# Patient Record
Sex: MALE | Race: WHITE | NOT HISPANIC OR LATINO | Employment: FULL TIME | URBAN - NONMETROPOLITAN AREA
[De-identification: names, ages, dates, MRNs, and addresses within clinical notes are randomized per-mention and may not be internally consistent; named-entity substitution may affect disease eponyms.]

---

## 2017-03-24 ENCOUNTER — HOSPITAL ENCOUNTER (EMERGENCY)
Facility: HOSPITAL | Age: 37
Discharge: HOME | End: 2017-03-24
Attending: EMERGENCY MEDICINE | Admitting: EMERGENCY MEDICINE

## 2017-03-24 ENCOUNTER — APPOINTMENT (OUTPATIENT)
Dept: CT IMAGING | Facility: HOSPITAL | Age: 37
End: 2017-03-24

## 2017-03-24 VITALS
OXYGEN SATURATION: 100 % | WEIGHT: 145 LBS | RESPIRATION RATE: 18 BRPM | DIASTOLIC BLOOD PRESSURE: 84 MMHG | HEIGHT: 66 IN | SYSTOLIC BLOOD PRESSURE: 122 MMHG | TEMPERATURE: 98.3 F | HEART RATE: 90 BPM | BODY MASS INDEX: 23.3 KG/M2

## 2017-03-24 DIAGNOSIS — N39.0 ACUTE UTI: ICD-10-CM

## 2017-03-24 DIAGNOSIS — N20.1 LEFT URETERAL STONE: Primary | ICD-10-CM

## 2017-03-24 LAB
ALBUMIN SERPL-MCNC: 4.7 G/DL (ref 3.5–5)
ALBUMIN/GLOB SERPL: 1.6 G/DL (ref 1.1–2.5)
ALP SERPL-CCNC: 76 U/L (ref 24–120)
ALT SERPL W P-5'-P-CCNC: 26 U/L (ref 0–54)
AMYLASE SERPL-CCNC: 91 U/L (ref 30–110)
ANION GAP SERPL CALCULATED.3IONS-SCNC: 12 MMOL/L (ref 4–13)
AST SERPL-CCNC: 24 U/L (ref 7–45)
BACTERIA UR QL AUTO: ABNORMAL /HPF
BASOPHILS # BLD AUTO: 0.06 10*3/MM3 (ref 0–0.2)
BASOPHILS NFR BLD AUTO: 0.9 % (ref 0–2)
BILIRUB SERPL-MCNC: 0.6 MG/DL (ref 0.1–1)
BILIRUB UR QL STRIP: NEGATIVE
BUN BLD-MCNC: 10 MG/DL (ref 5–21)
BUN/CREAT SERPL: 8.5 (ref 7–25)
CALCIUM SPEC-SCNC: 9.9 MG/DL (ref 8.4–10.4)
CHLORIDE SERPL-SCNC: 104 MMOL/L (ref 98–110)
CLARITY UR: ABNORMAL
CO2 SERPL-SCNC: 27 MMOL/L (ref 24–31)
COLOR UR: ABNORMAL
CREAT BLD-MCNC: 1.17 MG/DL (ref 0.5–1.4)
DEPRECATED RDW RBC AUTO: 39.4 FL (ref 40–54)
EOSINOPHIL # BLD AUTO: 0.04 10*3/MM3 (ref 0–0.7)
EOSINOPHIL NFR BLD AUTO: 0.6 % (ref 0–4)
ERYTHROCYTE [DISTWIDTH] IN BLOOD BY AUTOMATED COUNT: 13.1 % (ref 12–15)
GFR SERPL CREATININE-BSD FRML MDRD: 70 ML/MIN/1.73
GLOBULIN UR ELPH-MCNC: 3 GM/DL
GLUCOSE BLD-MCNC: 100 MG/DL (ref 70–100)
GLUCOSE UR STRIP-MCNC: NEGATIVE MG/DL
HCT VFR BLD AUTO: 47.4 % (ref 40–52)
HGB BLD-MCNC: 17 G/DL (ref 14–18)
HGB UR QL STRIP.AUTO: ABNORMAL
IMM GRANULOCYTES # BLD: 0.01 10*3/MM3 (ref 0–0.03)
IMM GRANULOCYTES NFR BLD: 0.1 % (ref 0–5)
KETONES UR QL STRIP: NEGATIVE
LEUKOCYTE ESTERASE UR QL STRIP.AUTO: ABNORMAL
LIPASE SERPL-CCNC: 103 U/L (ref 23–203)
LYMPHOCYTES # BLD AUTO: 2.48 10*3/MM3 (ref 0.72–4.86)
LYMPHOCYTES NFR BLD AUTO: 36.5 % (ref 15–45)
MCH RBC QN AUTO: 29.9 PG (ref 28–32)
MCHC RBC AUTO-ENTMCNC: 35.9 G/DL (ref 33–36)
MCV RBC AUTO: 83.3 FL (ref 82–95)
MONOCYTES # BLD AUTO: 0.8 10*3/MM3 (ref 0.19–1.3)
MONOCYTES NFR BLD AUTO: 11.8 % (ref 4–12)
NEUTROPHILS # BLD AUTO: 3.4 10*3/MM3 (ref 1.87–8.4)
NEUTROPHILS NFR BLD AUTO: 50.1 % (ref 39–78)
NITRITE UR QL STRIP: NEGATIVE
NT-PROBNP SERPL-MCNC: 22.4 PG/ML (ref 0–450)
PH UR STRIP.AUTO: >=9 [PH] (ref 5–8)
PLATELET # BLD AUTO: 231 10*3/MM3 (ref 130–400)
PMV BLD AUTO: 10.7 FL (ref 6–12)
POTASSIUM BLD-SCNC: 4 MMOL/L (ref 3.5–5.3)
PROT SERPL-MCNC: 7.7 G/DL (ref 6.3–8.7)
PROT UR QL STRIP: ABNORMAL
RBC # BLD AUTO: 5.69 10*6/MM3 (ref 4.8–5.9)
RBC # UR: ABNORMAL /HPF
REF LAB TEST METHOD: ABNORMAL
SODIUM BLD-SCNC: 143 MMOL/L (ref 135–145)
SP GR UR STRIP: 1.03 (ref 1–1.03)
SQUAMOUS #/AREA URNS HPF: ABNORMAL /HPF
UROBILINOGEN UR QL STRIP: ABNORMAL
WBC NRBC COR # BLD: 6.79 10*3/MM3 (ref 4.8–10.8)
WBC UR QL AUTO: ABNORMAL /HPF

## 2017-03-24 PROCEDURE — 74177 CT ABD & PELVIS W/CONTRAST: CPT

## 2017-03-24 PROCEDURE — 25010000002 HYDROMORPHONE PER 4 MG: Performed by: EMERGENCY MEDICINE

## 2017-03-24 PROCEDURE — 81001 URINALYSIS AUTO W/SCOPE: CPT | Performed by: EMERGENCY MEDICINE

## 2017-03-24 PROCEDURE — 25010000002 ONDANSETRON PER 1 MG: Performed by: EMERGENCY MEDICINE

## 2017-03-24 PROCEDURE — 96365 THER/PROPH/DIAG IV INF INIT: CPT

## 2017-03-24 PROCEDURE — 83880 ASSAY OF NATRIURETIC PEPTIDE: CPT | Performed by: EMERGENCY MEDICINE

## 2017-03-24 PROCEDURE — 80053 COMPREHEN METABOLIC PANEL: CPT | Performed by: EMERGENCY MEDICINE

## 2017-03-24 PROCEDURE — 96375 TX/PRO/DX INJ NEW DRUG ADDON: CPT

## 2017-03-24 PROCEDURE — 0 IOPAMIDOL PER 1 ML: Performed by: EMERGENCY MEDICINE

## 2017-03-24 PROCEDURE — 96376 TX/PRO/DX INJ SAME DRUG ADON: CPT

## 2017-03-24 PROCEDURE — 99283 EMERGENCY DEPT VISIT LOW MDM: CPT

## 2017-03-24 PROCEDURE — 96361 HYDRATE IV INFUSION ADD-ON: CPT

## 2017-03-24 PROCEDURE — 83690 ASSAY OF LIPASE: CPT | Performed by: EMERGENCY MEDICINE

## 2017-03-24 PROCEDURE — 87086 URINE CULTURE/COLONY COUNT: CPT | Performed by: EMERGENCY MEDICINE

## 2017-03-24 PROCEDURE — 85025 COMPLETE CBC W/AUTO DIFF WBC: CPT | Performed by: EMERGENCY MEDICINE

## 2017-03-24 PROCEDURE — 82150 ASSAY OF AMYLASE: CPT | Performed by: EMERGENCY MEDICINE

## 2017-03-24 PROCEDURE — 25010000002 CEFTRIAXONE: Performed by: EMERGENCY MEDICINE

## 2017-03-24 PROCEDURE — 25010000002 KETOROLAC TROMETHAMINE PER 15 MG: Performed by: EMERGENCY MEDICINE

## 2017-03-24 RX ORDER — ONDANSETRON 4 MG/1
4 TABLET, ORALLY DISINTEGRATING ORAL EVERY 8 HOURS PRN
Qty: 6 TABLET | Refills: 0 | Status: SHIPPED | OUTPATIENT
Start: 2017-03-24

## 2017-03-24 RX ORDER — KETOROLAC TROMETHAMINE 10 MG/1
10 TABLET, FILM COATED ORAL EVERY 6 HOURS PRN
Qty: 8 TABLET | Refills: 0 | Status: SHIPPED | OUTPATIENT
Start: 2017-03-24

## 2017-03-24 RX ORDER — KETOROLAC TROMETHAMINE 30 MG/ML
30 INJECTION, SOLUTION INTRAMUSCULAR; INTRAVENOUS ONCE
Status: COMPLETED | OUTPATIENT
Start: 2017-03-24 | End: 2017-03-24

## 2017-03-24 RX ORDER — TAMSULOSIN HYDROCHLORIDE 0.4 MG/1
1 CAPSULE ORAL DAILY
Qty: 30 CAPSULE | Refills: 0 | Status: SHIPPED | OUTPATIENT
Start: 2017-03-24

## 2017-03-24 RX ORDER — CIPROFLOXACIN 500 MG/1
500 TABLET, FILM COATED ORAL EVERY 12 HOURS
Qty: 6 TABLET | Refills: 0 | Status: SHIPPED | OUTPATIENT
Start: 2017-03-24 | End: 2017-03-27

## 2017-03-24 RX ORDER — ONDANSETRON 2 MG/ML
4 INJECTION INTRAMUSCULAR; INTRAVENOUS ONCE
Status: COMPLETED | OUTPATIENT
Start: 2017-03-24 | End: 2017-03-24

## 2017-03-24 RX ADMIN — ONDANSETRON 4 MG: 2 INJECTION INTRAMUSCULAR; INTRAVENOUS at 08:40

## 2017-03-24 RX ADMIN — HYDROMORPHONE HYDROCHLORIDE 1 MG: 1 INJECTION, SOLUTION INTRAMUSCULAR; INTRAVENOUS; SUBCUTANEOUS at 09:27

## 2017-03-24 RX ADMIN — ONDANSETRON 4 MG: 2 INJECTION INTRAMUSCULAR; INTRAVENOUS at 09:27

## 2017-03-24 RX ADMIN — KETOROLAC TROMETHAMINE 30 MG: 30 INJECTION, SOLUTION INTRAMUSCULAR at 10:52

## 2017-03-24 RX ADMIN — SODIUM CHLORIDE 1000 ML: 9 INJECTION, SOLUTION INTRAVENOUS at 08:40

## 2017-03-24 RX ADMIN — IOPAMIDOL 100 ML: 755 INJECTION, SOLUTION INTRAVENOUS at 09:39

## 2017-03-24 RX ADMIN — CEFTRIAXONE 1 G: 1 INJECTION, POWDER, FOR SOLUTION INTRAMUSCULAR; INTRAVENOUS at 10:53

## 2017-03-24 NOTE — ED PROVIDER NOTES
"Subjective   HPI Comments: Patient is a 36-year-old male who reports he was driving this morning and at about 6:45 AM he had sudden onset of \"constant, sharp\" left lower quadrant abdominal pain.  Patient reports that since then he has had nausea and vomiting times one.  He reports he is also had difficulty passing urine but when he denies he has burning-type urination and now urinary frequency/urgency.  Patient reports abdominal pain is worsened by nothing and relieved by nothing.  He reports his abdominal pain currently is an 8 and at its worse is a 10.      History provided by:  Patient      Review of Systems   Constitutional: Negative.    HENT: Negative.    Eyes: Negative.    Respiratory: Negative.    Cardiovascular: Negative.    Gastrointestinal: Positive for abdominal pain, nausea and vomiting.   Endocrine: Negative.    Genitourinary: Positive for difficulty urinating, dysuria, frequency and urgency.   Musculoskeletal: Negative.    Skin: Negative.    Allergic/Immunologic: Negative.    Neurological: Negative.    Hematological: Negative.    Psychiatric/Behavioral: Negative.    All other systems reviewed and are negative.      History reviewed. No pertinent past medical history.    No Known Allergies    Past Surgical History:   Procedure Laterality Date   • DENTAL PROCEDURE         History reviewed. No pertinent family history.    Social History     Social History   • Marital status:      Spouse name: N/A   • Number of children: N/A   • Years of education: N/A     Social History Main Topics   • Smoking status: Current Every Day Smoker     Packs/day: 2.00   • Smokeless tobacco: None   • Alcohol use No   • Drug use: No   • Sexual activity: Not Asked     Other Topics Concern   • None     Social History Narrative   • None           Objective   Physical Exam   Constitutional: He is oriented to person, place, and time. He appears well-developed and well-nourished.   Mildly ill-appearing.   HENT:   Head: " Normocephalic and atraumatic.   Right Ear: External ear normal.   Left Ear: External ear normal.   Nose: Nose normal.   Mouth/Throat: Oropharynx is clear and moist.   Eyes: Conjunctivae and EOM are normal. Pupils are equal, round, and reactive to light. Right eye exhibits no discharge. Left eye exhibits no discharge.   Neck: Normal range of motion. Neck supple. No tracheal deviation present. No thyromegaly present.   Cardiovascular: Normal rate, regular rhythm, normal heart sounds and intact distal pulses.    No murmur heard.  Pulmonary/Chest: Effort normal and breath sounds normal. No respiratory distress. He exhibits no tenderness.   Abdominal: Soft. He exhibits no distension. There is tenderness (Moderate pain to palpation of the left flank/left lower abdominal pain area.).   Musculoskeletal: Normal range of motion. He exhibits no edema, tenderness or deformity.   Neurological: He is alert and oriented to person, place, and time. No cranial nerve deficit.   Skin: Skin is warm and dry. No erythema. No pallor.   Psychiatric: He has a normal mood and affect. Judgment normal.   Nursing note and vitals reviewed.      Procedures         ED Course  ED Course                  MDM  Number of Diagnoses or Management Options  Acute UTI: new and requires workup  Left ureteral stone: new and requires workup     Amount and/or Complexity of Data Reviewed  Clinical lab tests: ordered and reviewed  Tests in the radiology section of CPT®: ordered and reviewed    Risk of Complications, Morbidity, and/or Mortality  Presenting problems: moderate  Diagnostic procedures: moderate  Management options: moderate    Patient Progress  Patient progress: stable      Final diagnoses:   Left ureteral stone   Acute UTI            Joaquín Hwang MD  03/24/17 5374

## 2017-03-24 NOTE — ED NOTES
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      Gee Cody RN  03/24/17 0802

## 2017-03-26 LAB — BACTERIA SPEC AEROBE CULT: NORMAL

## 2017-04-06 ENCOUNTER — TELEPHONE (OUTPATIENT)
Dept: UROLOGY | Facility: CLINIC | Age: 37
End: 2017-04-06

## 2017-04-06 DIAGNOSIS — N20.0 KIDNEY STONE: Primary | ICD-10-CM

## 2017-04-10 ENCOUNTER — HOSPITAL ENCOUNTER (OUTPATIENT)
Dept: GENERAL RADIOLOGY | Facility: HOSPITAL | Age: 37
Discharge: HOME | End: 2017-04-10
Attending: UROLOGY | Admitting: UROLOGY

## 2017-04-10 ENCOUNTER — OFFICE VISIT (OUTPATIENT)
Dept: UROLOGY | Facility: CLINIC | Age: 37
End: 2017-04-10

## 2017-04-10 VITALS
DIASTOLIC BLOOD PRESSURE: 78 MMHG | HEIGHT: 67 IN | BODY MASS INDEX: 24.23 KG/M2 | SYSTOLIC BLOOD PRESSURE: 130 MMHG | WEIGHT: 154.4 LBS | TEMPERATURE: 97.4 F

## 2017-04-10 DIAGNOSIS — N20.0 KIDNEY STONE: ICD-10-CM

## 2017-04-10 DIAGNOSIS — N13.2 URETERAL STONE WITH HYDRONEPHROSIS: Primary | ICD-10-CM

## 2017-04-10 LAB
BILIRUB BLD-MCNC: NEGATIVE MG/DL
CLARITY, POC: CLEAR
COLOR UR: YELLOW
GLUCOSE UR STRIP-MCNC: ABNORMAL MG/DL
KETONES UR QL: NEGATIVE
LEUKOCYTE EST, POC: NEGATIVE
NITRITE UR-MCNC: NEGATIVE MG/ML
PH UR: 5 [PH] (ref 5–8)
PROT UR STRIP-MCNC: NEGATIVE MG/DL
RBC # UR STRIP: NEGATIVE /UL
SP GR UR: 1.03 (ref 1–1.03)
UROBILINOGEN UR QL: NORMAL

## 2017-04-10 PROCEDURE — 99203 OFFICE O/P NEW LOW 30 MIN: CPT | Performed by: UROLOGY

## 2017-04-10 PROCEDURE — 81003 URINALYSIS AUTO W/O SCOPE: CPT | Performed by: UROLOGY

## 2017-04-10 PROCEDURE — 74000 HC ABDOMEN KUB: CPT

## 2017-04-10 RX ORDER — LOSARTAN POTASSIUM 25 MG/1
TABLET ORAL
COMMUNITY
Start: 2017-04-06

## 2017-04-10 NOTE — PROGRESS NOTES
Subjective    Mr. Dee is 36 y.o. male    Chief Complaint: Left Flank Pain    History of Present Illness     Urolithiasis  Patient complains of left flank pain with radiation to the abdomen. Onset of symptoms was abrupt starting 2 weeks ago with rapidly improving course since that time. Patient describes the pain as colicky, continuous and rated as moderate. The patient has had no nausea and no vomiting. There has been no fever or chills. The patient is not complaining of dysuria, frequency, or urgency.  Previous management of stones includes spontaneous passage      The following portions of the patient's history were reviewed and updated as appropriate: allergies, current medications, past family history, past medical history, past social history, past surgical history and problem list.    Review of Systems   Constitutional: Negative for appetite change and fever.   HENT: Negative for hearing loss and sore throat.    Eyes: Negative for pain and redness.   Respiratory: Negative for cough and shortness of breath.    Cardiovascular: Negative for chest pain and leg swelling.   Gastrointestinal: Negative for anal bleeding, nausea and vomiting.   Endocrine: Negative for cold intolerance and heat intolerance.   Genitourinary: Positive for flank pain (left). Negative for dysuria and hematuria.   Musculoskeletal: Negative for joint swelling and myalgias.   Skin: Negative for color change and rash.   Allergic/Immunologic: Negative for immunocompromised state.   Neurological: Negative for dizziness and speech difficulty.   Hematological: Negative for adenopathy. Does not bruise/bleed easily.   Psychiatric/Behavioral: Negative for dysphoric mood and suicidal ideas.         Current Outpatient Prescriptions:   •  losartan (COZAAR) 25 MG tablet, , Disp: , Rfl:   •  ketorolac (TORADOL) 10 MG tablet, Take 1 tablet by mouth Every 6 (Six) Hours As Needed for Moderate Pain (4-6)., Disp: 8 tablet, Rfl: 0  •  ondansetron ODT  "(ZOFRAN-ODT) 4 MG disintegrating tablet, Take 1 tablet by mouth Every 8 (Eight) Hours As Needed for Nausea., Disp: 6 tablet, Rfl: 0  •  tamsulosin (FLOMAX) 0.4 MG capsule 24 hr capsule, Take 1 capsule by mouth Daily., Disp: 30 capsule, Rfl: 0    Past Medical History:   Diagnosis Date   • Hypertension    • Kidney stone        Past Surgical History:   Procedure Laterality Date   • DENTAL PROCEDURE         Social History     Social History   • Marital status:      Spouse name: N/A   • Number of children: N/A   • Years of education: N/A     Social History Main Topics   • Smoking status: Current Every Day Smoker     Packs/day: 0.50   • Smokeless tobacco: None   • Alcohol use Yes   • Drug use: No   • Sexual activity: Not Asked     Other Topics Concern   • None     Social History Narrative       Family History   Problem Relation Age of Onset   • No Known Problems Father    • No Known Problems Mother        Objective    /78  Temp 97.4 °F (36.3 °C)  Ht 67\" (170.2 cm)  Wt 154 lb 6.4 oz (70 kg)  BMI 24.18 kg/m2    Physical Exam   Constitutional: He is oriented to person, place, and time. He appears well-developed and well-nourished. No distress.   HENT:   Nose: Nose normal.   Neck: Normal range of motion. Neck supple. No tracheal deviation present. No thyromegaly present.   Cardiovascular: Normal rate and intact distal pulses.    No significant edema or tenderness    Pulmonary/Chest: Effort normal. No accessory muscle usage. No respiratory distress.   Abdominal: Soft. Bowel sounds are normal. He exhibits no distension, no ascites and no mass. There is no hepatosplenomegaly. There is no tenderness. There is no rebound, no guarding and no CVA tenderness. No hernia.   Stool specimen is not indicated for my portion of the exam   Genitourinary: Testes normal. Rectal exam shows no mass, no tenderness, anal tone normal and guaiac negative stool. Tender: no nodules. Right testis shows no mass, no swelling and no " tenderness. Left testis shows no mass, no swelling and no tenderness. No penile tenderness (no lesion or deformities).   Genitourinary Comments:     Lymphadenopathy:     He has no cervical adenopathy. No inguinal adenopathy noted on the right or left side.        Right: No inguinal adenopathy present.        Left: No inguinal adenopathy present.   Neurological: He is alert and oriented to person, place, and time.   Skin: Skin is warm and dry. No rash noted. He is not diaphoretic. No pallor.   Psychiatric: He has a normal mood and affect. His behavior is normal.   Vitals reviewed.          Results for orders placed or performed in visit on 04/10/17   POC Urinalysis Dipstick, Automated   Result Value Ref Range    Color Yellow Yellow, Straw, Dark Yellow, Prachi    Clarity, UA Clear Clear    Glucose,  mg/dL (A) Negative, 1000 mg/dL (3+) mg/dL    Bilirubin Negative Negative    Ketones, UA Negative Negative    Specific Gravity  1.030 1.005 - 1.030    Blood, UA Negative Negative    pH, Urine 5.0 5.0 - 8.0    Protein, POC Negative Negative mg/dL    Urobilinogen, UA Normal Normal    Leukocytes Negative Negative    Nitrite, UA Negative Negative   CT independent review  The CT scan of the abdomen/pelvis done without contrast is available for me to review.  Treatment recommendations require an independent review.  First I scanned the liver, spleen, and bowel pattern.  The retroperitoneum including the major vessels and lymphatic packages are briefly reviewed.  This film as been reviewed by the radiologist to determine any non urologic abnormalities that are present.  The kidneys are closely inspected for size, symmetry, contour, parenchymal thickness, perinephric reaction, presence of calcifications, and intrarenal dilation of the collecting system.  The ureters are inspected for their course, caliber, and any calcifications.  The bladder is inspected for its thickness, size, and presence of any calcifications.  This scan  shows:    The right kidney appears normal on this non-contrasted CT scan.  The renal parenchymal is norml in thickness.  There are no solid masses or cysts.  There is no hydronephrosis.  There are no stones.      The left kidney appears hydroureteronephrosis down to the level of the UVJ on the left there is a 4 mm stone present    The bladder appears normal on this non-contrasted CT scan.  The bladder appears normal in thickness.  There no masses or stones seen on this exam.    KUB independent review    A KUB is available for me to review today.  The image is inspected for a bowel gas pattern and the general bone structure of the spine and pelvis. The kidneys are then inspected closely.  Renal outline is noted if identifiable. The kidney, collecting system, and anticipated path of the ureter are examined for calcifications including those in the true pelvis.  This film reveals:    On the right there are no calcificaitons seen in the kidney or the expected course of the ureter. .    On the left there are no calcificaitons seen in the kidney or the expected course of the ureter. .      Assessment and Plan    Walt was seen today for flank pain.    Diagnoses and all orders for this visit:    Ureteral stone with hydronephrosis  -     POC Urinalysis Dipstick, Automated      Patient has been pain free since March 25.  His KUB today is negative for any stone along the ureter.  We discussed dietary prevention of kidney stones.  He has a significant family history.  I would like to make sure his hydronephrosis has resolved we will check an ultrasound.

## 2022-02-01 ENCOUNTER — APPOINTMENT (OUTPATIENT)
Dept: GENERAL RADIOLOGY | Age: 42
End: 2022-02-01
Payer: COMMERCIAL

## 2022-02-01 ENCOUNTER — HOSPITAL ENCOUNTER (EMERGENCY)
Age: 42
Discharge: HOME | End: 2022-02-01
Payer: COMMERCIAL

## 2022-02-01 VITALS
HEART RATE: 72 BPM | RESPIRATION RATE: 18 BRPM | OXYGEN SATURATION: 98 % | HEIGHT: 66 IN | DIASTOLIC BLOOD PRESSURE: 90 MMHG | WEIGHT: 145 LBS | TEMPERATURE: 98.6 F | SYSTOLIC BLOOD PRESSURE: 137 MMHG | BODY MASS INDEX: 23.3 KG/M2

## 2022-02-01 DIAGNOSIS — U07.1 COVID-19: Primary | ICD-10-CM

## 2022-02-01 LAB — SARS-COV-2, NAAT: DETECTED

## 2022-02-01 PROCEDURE — 99283 EMERGENCY DEPT VISIT LOW MDM: CPT

## 2022-02-01 PROCEDURE — 87635 SARS-COV-2 COVID-19 AMP PRB: CPT

## 2022-02-01 PROCEDURE — 71045 X-RAY EXAM CHEST 1 VIEW: CPT

## 2022-02-01 RX ORDER — BENZONATATE 100 MG/1
100 CAPSULE ORAL 3 TIMES DAILY PRN
Qty: 30 CAPSULE | Refills: 0 | Status: SHIPPED | OUTPATIENT
Start: 2022-02-01 | End: 2022-02-08

## 2022-02-01 ASSESSMENT — ENCOUNTER SYMPTOMS: COUGH: 1

## 2022-02-02 NOTE — ED NOTES
Bed: 07  Expected date:   Expected time:   Means of arrival:   Comments:     Rubi Barger  02/01/22 6838

## 2022-02-02 NOTE — ED PROVIDER NOTES
[facility]  eMERGENCY dEPARTMENT eNCOUnter      Pt Name: Guerline Irving  MRN: [id number]  [place] 1980  Date of evaluation: 2/1/2022  Provider: SOCO Bass    CHIEF COMPLAINT       Chief Complaint   Patient presents with    Concern For COVID-19     fever, loss of taste and smell; wife has covid         HISTORY OF PRESENT ILLNESS   (Location/Symptom, Timing/Onset,Context/Setting, Quality, Duration, Modifying Factors, Severity)  Note limiting factors. Guerline Irving is a 35 y.o. male who presents to the emergency department with shortness of breath and coughing. Patient has been sick a week. Patient's wife has Covid. He has not been vaccinated. The history is provided by the patient. URI  Presenting symptoms: congestion, cough and fever    Severity:  Moderate  Onset quality:  Sudden  Duration:  1 week  Timing:  Constant  Progression:  Worsening  Chronicity:  New  Risk factors: sick contacts        NursingNotes were reviewed. REVIEW OF SYSTEMS    (2-9 systems for level 4, 10 or more for level 5)     Review of Systems   Constitutional: Positive for fever. HENT: Positive for congestion. Respiratory: Positive for cough. Except as noted above the remainder of the review of systems was reviewed and negative. PAST MEDICAL HISTORY   No past medical history on file. SURGICALHISTORY     No past surgical history on file. CURRENT MEDICATIONS       Discharge Medication List as of 2/1/2022 11:42 PM          ALLERGIES     Patient has no known allergies. FAMILY HISTORY     No family history on file.        SOCIAL HISTORY       Social History     Socioeconomic History    Marital status:      Spouse name: Not on file    Number of children: Not on file    Years of education: Not on file    Highest education level: Not on file   Occupational History    Not on file   Tobacco Use    Smoking status: Not on file    Smokeless tobacco: Not on file   Substance and Sexual Activity    Alcohol use: Not on file    Drug use: Not on file    Sexual activity: Not on file   Other Topics Concern    Not on file   Social History Narrative    Not on file     Social Determinants of Health     Financial Resource Strain:     Difficulty of Paying Living Expenses: Not on file   Food Insecurity:     Worried About Running Out of Food in the Last Year: Not on file    July of Food in the Last Year: Not on file   Transportation Needs:     Lack of Transportation (Medical): Not on file    Lack of Transportation (Non-Medical): Not on file   Physical Activity:     Days of Exercise per Week: Not on file    Minutes of Exercise per Session: Not on file   Stress:     Feeling of Stress : Not on file   Social Connections:     Frequency of Communication with Friends and Family: Not on file    Frequency of Social Gatherings with Friends and Family: Not on file    Attends Hinduism Services: Not on file    Active Member of [address] [place] [address] or Organizations: Not on file    Attends Club or Organization Meetings: Not on file    Marital Status: Not on file   Intimate Partner Violence:     Fear of Current or Ex-Partner: Not on file    Emotionally Abused: Not on file    Physically Abused: Not on file    Sexually Abused: Not on file   Housing Stability:     Unable to Pay for Housing in the Last Year: Not on file    Number of [place] in the Last Year: Not on file    Unstable Housing in the Last Year: Not on file       SCREENINGS    [place] Coma Scale  Eye Opening: Spontaneous  Best Verbal Response: Oriented  Best Motor Response: Obeys commands  [place] Coma Scale Score: 15 @FLOW([id number])@      PHYSICAL EXAM    (up to 7 for level 4, 8 or more for level 5)     ED Triage Vitals [02/01/22 2159]   BP Temp Temp Source Pulse Resp SpO2 Height Weight   129/89 98.9 °F (37.2 °C) Temporal 86 17 98 % 5' 6\" (1.676 m) 145 lb (65.8 kg)       Physical Exam  Vitals and nursing note reviewed.    Constitutional: Appearance: He is well-developed. HENT:      Head: Normocephalic and atraumatic. Eyes:      General: No scleral icterus. Right eye: No discharge. Left eye: No discharge. Cardiovascular:      Rate and Rhythm: Normal rate and regular rhythm. Heart sounds: Normal heart sounds. Pulmonary:      Effort: Pulmonary effort is normal. No respiratory distress. Breath sounds: Normal breath sounds. No rhonchi. Abdominal:      Palpations: Abdomen is soft. Musculoskeletal:      Cervical back: Normal range of motion and neck supple. Neurological:      Mental Status: He is alert. Psychiatric:         Behavior: Behavior normal.         DIAGNOSTIC RESULTS     EKG: All EKG's are interpreted by the Emergency Department Physician who either signs or Co-signsthis chart in the absence of a cardiologist.        RADIOLOGY:   [facility] such as CT, Ultrasound and MRI are read by the radiologist. Plain radiographic images are visualized and preliminarily interpreted by the emergency physician with the below findings:      Interpretation per the Radiologist below, if available at the time of this note:    XR CHEST PORTABLE   Final Result   1. Negative chest   Signed by Dr Raquel Aburto      neg      ED BEDSIDEULTRASOUND:   Performed by ED Physician -none    LABS:  Labs Reviewed   COVID-19, RAPID - Abnormal; Notable for the following components:       Result Value    SARS-CoV-2, NAAT DETECTED (*)     All other components within normal limits    Narrative:     Tanner Ch tel. ,  Microbiology results called to and read back by Rudolfo Duverney, RN, ER,  02/01/2022 23:06, by [facility]       All other labs were within normal range or not returned as of this dictation.     EMERGENCY DEPARTMENT COURSE and DIFFERENTIALDIAGNOSIS/MDM:   Vitals:    Vitals:    02/01/22 2159 02/01/22 2340   BP: 129/89 (!) 137/90   Pulse: 86 72   Resp: 17 18   Temp: 98.9 °F (37.2 °C) 98.6 °F (37 °C)   TempSrc: Temporal Oral SpO2: 98% 98%   Weight: 145 lb (65.8 kg)    Height: 5' 6\" (1.676 m)            MDM   Pt educated    CONSULTS:  None    PROCEDURES:  Unless otherwise noted below, none     Procedures    FINAL IMPRESSION      1. COVID-19        DISPOSITION/PLAN   DISPOSITION        PATIENT REFERRED TO:  No follow-up provider specified.     DISCHARGE MEDICATIONS:  Discharge Medication List as of 2/1/2022 11:42 PM      START taking these medications    Details   benzonatate (TESSALON) 100 MG capsule Take 1 capsule by mouth 3 times daily as needed for Cough, Disp-30 capsule, R-0Normal                (Please note that portions of this note were completed with a voice recognitionprogram.  Efforts were made to edit the dictations but occasionally words are mis-transcribed.)    SOCO Loo Mai (electronically signed)         SOCO Loo Mai  02/02/22 1530

## 2022-02-03 ENCOUNTER — CARE COORDINATION (OUTPATIENT)
Dept: CARE COORDINATION | Age: 42
End: 2022-02-03

## 2024-08-12 ENCOUNTER — OFFICE VISIT (OUTPATIENT)
Dept: FAMILY MEDICINE CLINIC | Facility: CLINIC | Age: 44
End: 2024-08-12
Payer: COMMERCIAL

## 2024-08-12 ENCOUNTER — LAB (OUTPATIENT)
Dept: LAB | Facility: HOSPITAL | Age: 44
End: 2024-08-12
Payer: COMMERCIAL

## 2024-08-12 VITALS
SYSTOLIC BLOOD PRESSURE: 166 MMHG | HEIGHT: 67 IN | BODY MASS INDEX: 24.17 KG/M2 | DIASTOLIC BLOOD PRESSURE: 111 MMHG | OXYGEN SATURATION: 99 % | WEIGHT: 154 LBS | HEART RATE: 75 BPM

## 2024-08-12 DIAGNOSIS — R20.2 TINGLING OF BOTH UPPER EXTREMITIES: ICD-10-CM

## 2024-08-12 DIAGNOSIS — R07.9 CHEST PAIN, UNSPECIFIED TYPE: ICD-10-CM

## 2024-08-12 DIAGNOSIS — I10 PRIMARY HYPERTENSION: ICD-10-CM

## 2024-08-12 DIAGNOSIS — I10 PRIMARY HYPERTENSION: Primary | ICD-10-CM

## 2024-08-12 LAB
ALBUMIN SERPL-MCNC: 5 G/DL (ref 3.5–5)
ALBUMIN/GLOB SERPL: 1.4 G/DL (ref 1.1–2.5)
ALP SERPL-CCNC: 63 U/L (ref 24–120)
ALT SERPL W P-5'-P-CCNC: 34 U/L (ref 0–50)
ANION GAP SERPL CALCULATED.3IONS-SCNC: 12 MMOL/L (ref 4–13)
AST SERPL-CCNC: 46 U/L (ref 7–45)
AUTO MIXED CELLS #: 1.3 10*3/MM3 (ref 0.1–2.6)
AUTO MIXED CELLS %: 10.2 % (ref 0.1–24)
BILIRUB SERPL-MCNC: 1.1 MG/DL (ref 0.1–1)
BUN SERPL-MCNC: 10 MG/DL (ref 5–21)
BUN/CREAT SERPL: 12
CALCIUM SPEC-SCNC: 8.9 MG/DL (ref 8.6–10.5)
CHLORIDE SERPL-SCNC: 104 MMOL/L (ref 98–110)
CO2 SERPL-SCNC: 22 MMOL/L (ref 24–31)
CREAT SERPL-MCNC: 0.83 MG/DL (ref 0.5–1.4)
D-DIMER, QUANTITATIVE (MAD,POW, STR): <100 NG/ML (FEU) (ref 0–500)
EGFRCR SERPLBLD CKD-EPI 2021: 110 ML/MIN/1.73
ERYTHROCYTE [DISTWIDTH] IN BLOOD BY AUTOMATED COUNT: 13.9 % (ref 12.3–15.4)
GLOBULIN UR ELPH-MCNC: 3.7 GM/DL
GLUCOSE SERPL-MCNC: 92 MG/DL (ref 70–100)
HCT VFR BLD AUTO: 53.1 % (ref 37.5–51)
HGB BLD-MCNC: 17.9 G/DL (ref 13–17.7)
LYMPHOCYTES # BLD AUTO: 1.7 10*3/MM3 (ref 0.7–3.1)
LYMPHOCYTES NFR BLD AUTO: 13.5 % (ref 19.6–45.3)
MCH RBC QN AUTO: 30.4 PG (ref 26.6–33)
MCHC RBC AUTO-ENTMCNC: 33.7 G/DL (ref 31.5–35.7)
MCV RBC AUTO: 90.3 FL (ref 79–97)
NEUTROPHILS NFR BLD AUTO: 76.3 % (ref 42.7–76)
NEUTROPHILS NFR BLD AUTO: 9.5 10*3/MM3 (ref 1.7–7)
PLATELET # BLD AUTO: 246 10*3/MM3 (ref 140–450)
PMV BLD AUTO: 9.4 FL (ref 6–12)
POTASSIUM SERPL-SCNC: 4.9 MMOL/L (ref 3.5–5.3)
PROT SERPL-MCNC: 8.7 G/DL (ref 6.3–8.7)
RBC # BLD AUTO: 5.88 10*6/MM3 (ref 4.14–5.8)
SODIUM SERPL-SCNC: 138 MMOL/L (ref 135–145)
TROPONIN I SERPL-MCNC: <0.05 NG/ML
WBC NRBC COR # BLD AUTO: 12.5 10*3/MM3 (ref 3.4–10.8)

## 2024-08-12 PROCEDURE — 84484 ASSAY OF TROPONIN QUANT: CPT

## 2024-08-12 PROCEDURE — 36415 COLL VENOUS BLD VENIPUNCTURE: CPT

## 2024-08-12 PROCEDURE — 85379 FIBRIN DEGRADATION QUANT: CPT

## 2024-08-12 PROCEDURE — 80053 COMPREHEN METABOLIC PANEL: CPT

## 2024-08-12 PROCEDURE — 93000 ELECTROCARDIOGRAM COMPLETE: CPT

## 2024-08-12 PROCEDURE — 85025 COMPLETE CBC W/AUTO DIFF WBC: CPT

## 2024-08-12 PROCEDURE — 99204 OFFICE O/P NEW MOD 45 MIN: CPT

## 2024-08-12 RX ORDER — LOSARTAN POTASSIUM 25 MG/1
25 TABLET ORAL DAILY
Qty: 30 TABLET | Refills: 0 | Status: SHIPPED | OUTPATIENT
Start: 2024-08-12

## 2024-08-12 RX ORDER — CLONIDINE HYDROCHLORIDE 0.1 MG/1
0.1 TABLET ORAL DAILY PRN
Qty: 30 TABLET | Refills: 1 | Status: SHIPPED | OUTPATIENT
Start: 2024-08-12 | End: 2024-08-12

## 2024-08-12 RX ORDER — CLONIDINE HYDROCHLORIDE 0.1 MG/1
0.1 TABLET ORAL DAILY PRN
Qty: 30 TABLET | Refills: 1 | Status: SHIPPED | OUTPATIENT
Start: 2024-08-12

## 2024-08-12 NOTE — PROGRESS NOTES
"Chief Complaint  Establish Care and Hypertension    Subjective    History of Present Illness      Patient presents to [facility] for   History of Present Illness  Pt presents today to establish care and HTN. Pt states last night when he woke up from a nap he was flushed, sweating excessively, light headed and weak and bp was 176/140. States sometimes bilateral hands will tingle and occasionally get a faint sharp heart pain/muscle pain. States his bp this morning was 147/110 but in office on the left arm it was 147/95 and then right arm was 166/111. Gave pt some home bp logs        Review of Systems   Neurological:  Positive for headache.   All other systems reviewed and are negative.      I have reviewed and agree with the HPI and ROS information as above.  Juany Paige, APRN     Objective   Vital Signs:   BP (!) 166/111   Pulse 75   Ht 170.2 cm (67.01\")   Wt 69.9 kg (154 lb)   SpO2 99%   BMI 24.11 kg/m²     BMI is within normal parameters. No other follow-up for BMI required.      Physical Exam  Constitutional:       Appearance: Normal appearance. He is well-developed.   HENT:      Head: Normocephalic and atraumatic.      Right Ear: Tympanic membrane, ear canal and external ear normal.      Left Ear: Tympanic membrane, ear canal and external ear normal.      Nose: Nose normal. No septal deviation, nasal tenderness or congestion.      Mouth/Throat:      Lips: Pink. No lesions.      Mouth: Mucous membranes are moist. No oral lesions.      Dentition: Normal dentition.      Pharynx: Oropharynx is clear. No pharyngeal swelling, oropharyngeal exudate or posterior oropharyngeal erythema.   Eyes:      General: Lids are normal. Vision grossly intact. No scleral icterus.        Right eye: No discharge.         Left eye: No discharge.      Extraocular Movements: Extraocular movements intact.      Conjunctiva/sclera: Conjunctivae normal.      Right eye: Right conjunctiva is not injected. "      Left eye: Left conjunctiva is not injected.      Pupils: Pupils are equal, round, and reactive to light.   Neck:      Thyroid: No thyroid mass.      Trachea: Trachea normal.   Cardiovascular:      Rate and Rhythm: Normal rate and regular rhythm.      Heart sounds: Normal heart sounds. No murmur heard.     No gallop.   Pulmonary:      Effort: Pulmonary effort is normal.      Breath sounds: Normal breath sounds and air entry. No wheezing, rhonchi or rales.   Abdominal:      General: There is no distension.      Palpations: Abdomen is soft. There is no mass.      Tenderness: There is no abdominal tenderness. There is no right CVA tenderness, left CVA tenderness, guarding or rebound.   Musculoskeletal:         General: No tenderness or deformity. Normal range of motion.      Cervical back: Full passive range of motion without pain, normal range of motion and neck supple.      Thoracic back: Normal.      Right lower leg: No edema.      Left lower leg: No edema.   Skin:     General: Skin is warm and dry.      Coloration: Skin is not jaundiced.      Findings: No rash.   Neurological:      Mental Status: He is alert and oriented to person, place, and time.      Sensory: Sensation is intact.      Motor: Motor function is intact.      Coordination: Coordination is intact.      Gait: Gait is intact.      Deep Tendon Reflexes: Reflexes are normal and symmetric.   Psychiatric:         Mood and Affect: Mood and affect normal.         Judgment: Judgment normal.          MISA-7:      PHQ-2 Depression Screening  Little interest or pleasure in doing things? 0-->not at all   Feeling down, depressed, or hopeless? 0-->not at all   PHQ-2 Total Score 0     PHQ-9 Depression Screening  Little interest or pleasure in doing things? 0-->not at all   Feeling down, depressed, or hopeless? 0-->not at all   Trouble falling or staying asleep, or sleeping too much?     Feeling tired or having little energy?     Poor appetite or overeating?      Feeling bad about yourself - or that you are a failure or have let yourself or your family down?     Trouble concentrating on things, such as reading the newspaper or watching television?     Moving or speaking so slowly that other people could have noticed? Or the opposite - being so fidgety or restless that you have been moving around a lot more than usual?     Thoughts that you would be better off dead, or of hurting yourself in some way?     PHQ-9 Total Score 0   If you checked off any problems, how difficult have these problems made it for you to do your work, take care of things at home, or get along with other people?        Result Review  Data Reviewed:              ECG 12 Lead    Date/Time: 8/12/2024 10:45 AM  Performed by: Juany Paige APRN    Authorized by: Juany Paige APRN  Previous ECG: no previous ECG available  Rhythm: sinus rhythm  Rate: normal  BPM: 63  Conduction: conduction normal  ST Segments: ST segments normal  T Waves: T waves normal  QRS axis: normal  Other: no other findings    Clinical impression: normal ECG            Assessment and Plan      Diagnoses and all orders for this visit:    1. Primary hypertension (Primary)  -     CBC Auto Differential; Future  -     Comprehensive Metabolic Panel; Future  -     D-dimer, Quantitative; Future  -     High Sensitivity Troponin T; Future  -     losartan (Cozaar) 25 MG tablet; Take 1 tablet by mouth Daily.  Dispense: 30 tablet; Refill: 0  -     Discontinue: cloNIDine (Catapres) 0.1 MG tablet; Take 1 tablet by mouth Daily As Needed for High Blood Pressure.  Dispense: 30 tablet; Refill: 1  -     Adult Stress Echo W/ Cont or Stress Agent if Necessary Per Protocol; Future  -     cloNIDine (Catapres) 0.1 MG tablet; Take 1 tablet by mouth Daily As Needed for High Blood Pressure. Please take if BP is >160/90 up to two times a day  Dispense: 30 tablet; Refill: 1  -     TSH; Future    2. Chest pain, unspecified type  -     CBC Auto Differential;  "Future  -     Comprehensive Metabolic Panel; Future  -     D-dimer, Quantitative; Future  -     High Sensitivity Troponin T; Future  -     Adult Stress Echo W/ Cont or Stress Agent if Necessary Per Protocol; Future  -     TSH; Future    3. Tingling of both upper extremities  -     CBC Auto Differential; Future  -     Comprehensive Metabolic Panel; Future  -     D-dimer, Quantitative; Future  -     High Sensitivity Troponin T; Future  -     Adult Stress Echo W/ Cont or Stress Agent if Necessary Per Protocol; Future  -     TSH; Future    Other orders  -     ECG 12 Lead    Patient is seen today with complaints of hypertension.  He states that he was on blood pressure medicine when he was in his 30s and he states that he got better so he quit taking this medicine.  He states he feels like he has done well until last night, hit him.  He states that he had taken an hour nap and woke up and he got flushed along with profusely sweating just sitting still.  Patient states that last night his blood pressure was around 170s/140s.  He states he did have some chest pain and pressure.  He states that he gets tingling in both of his upper extremities.  I did question him regarding his BP today if he was having chest pain, dizziness, headache.  He states that he just has more chest muscle tightness along with a mild headache and is very fatigued which he feels like is from his blood pressure being high.  I discussed with him the importance of getting his BP under control.  I discussed with him we will do labs today along with restart blood pressure meds.  We will restart losartan as he did well with this.  I will also give him clonidine just for as needed basis.  Patient does have a moderate cardiovascular family history.  Patient states that 2 uncles on his father side passed away with heart attacks, his grandmother he states has \"a weak heart.  \"    Plan  Labs ordered as above  Stress Echo ordered  Start Losartan 25mg, BP log " given to patient in office. Low NA diet, and decrease tobacco intake   Clonidine 0.1mg PRN given, Discussed with patient to take if bp> 160/90. Did discuss if no response in BP or chest pain, dizziness, SOB occur to report to ED.   ECG completed and NSR of 63bpm      Follow Up   Return in about 2 weeks (around 8/26/2024) for Hypertension.  Patient was given instructions and counseling regarding his condition or for health maintenance advice. Please see specific information pulled into the AVS if appropriate.

## 2024-08-14 ENCOUNTER — PATIENT ROUNDING (BHMG ONLY) (OUTPATIENT)
Dept: FAMILY MEDICINE CLINIC | Facility: CLINIC | Age: 44
End: 2024-08-14
Payer: COMMERCIAL

## 2024-08-14 ENCOUNTER — TELEPHONE (OUTPATIENT)
Dept: FAMILY MEDICINE CLINIC | Facility: CLINIC | Age: 44
End: 2024-08-14
Payer: COMMERCIAL

## 2024-08-14 NOTE — PROGRESS NOTES
August 14, 2024    Hello, may I speak with Walt Dee?    My name is Maria Luisa      I am  with [facility]  [facility]  [address]  [place] [address] [phone].    Before we get started may I verify your date of birth? 1980    I am calling to officially welcome you to our practice and ask about your recent visit. Is this a good time to talk? yes    Tell me about your visit with us. What things went well?  Yes everything went really good.       We're always looking for ways to make our patients' experiences even better. Do you have recommendations on ways we may improve?  no    Overall were you satisfied with your first visit to our practice? yes       I appreciate you taking the time to speak with me today. Is there anything else I can do for you? Yes can we reschedule the f/u appt. I'm going to be out of town. ( Rescheduled his appt)      Thank you, and have a great day.

## 2024-08-14 NOTE — TELEPHONE ENCOUNTER
----- Message from Juany Paige sent at 8/14/2024  7:32 AM CDT -----  Please let patient know that labs show  CMP stable  Cbc slight increase In white count; he is likely fighting off something. Patient also has increased hgb and rbc, patient would benefit from donating blood   Troponin stable  Ddimer sstable

## 2024-08-14 NOTE — TELEPHONE ENCOUNTER
Wife (Latasha on verbal release), contact office and spoke to this nurse. Verified pt's name and . Informed of CMP stable  Cbc slight increase In white count; he is likely fighting off something. Patient also has increased hgb and rbc, patient would benefit from donating blood   Troponin stable  Ddimer sstable  Pt requesting main scheduling's number to get echo scheduled.  Pt's wife vu of above and thanked staff.

## 2024-08-21 ENCOUNTER — HOSPITAL ENCOUNTER (OUTPATIENT)
Dept: CARDIOLOGY | Facility: HOSPITAL | Age: 44
Discharge: HOME | End: 2024-08-21
Payer: COMMERCIAL

## 2024-08-21 VITALS — BODY MASS INDEX: 23.54 KG/M2 | HEIGHT: 67 IN | WEIGHT: 150 LBS

## 2024-08-21 DIAGNOSIS — R07.9 CHEST PAIN, UNSPECIFIED TYPE: ICD-10-CM

## 2024-08-21 DIAGNOSIS — I10 PRIMARY HYPERTENSION: ICD-10-CM

## 2024-08-21 DIAGNOSIS — R20.2 TINGLING OF BOTH UPPER EXTREMITIES: ICD-10-CM

## 2024-08-21 LAB
BH CV STRESS BP STAGE 1: NORMAL
BH CV STRESS BP STAGE 2: NORMAL
BH CV STRESS BP STAGE 3: NORMAL
BH CV STRESS BP STAGE 4: NORMAL
BH CV STRESS DURATION MIN STAGE 1: 3
BH CV STRESS DURATION MIN STAGE 2: 3
BH CV STRESS DURATION MIN STAGE 3: 3
BH CV STRESS DURATION MIN STAGE 4: 2
BH CV STRESS DURATION SEC STAGE 1: 0
BH CV STRESS DURATION SEC STAGE 2: 0
BH CV STRESS DURATION SEC STAGE 3: 0
BH CV STRESS DURATION SEC STAGE 4: 0
BH CV STRESS GRADE STAGE 1: 10
BH CV STRESS GRADE STAGE 2: 12
BH CV STRESS GRADE STAGE 3: 14
BH CV STRESS GRADE STAGE 4: 16
BH CV STRESS HR STAGE 1: 97
BH CV STRESS HR STAGE 2: 109
BH CV STRESS HR STAGE 3: 123
BH CV STRESS HR STAGE 4: 148
BH CV STRESS METS STAGE 1: 5
BH CV STRESS METS STAGE 2: 7.5
BH CV STRESS METS STAGE 3: 10
BH CV STRESS METS STAGE 4: 13.5
BH CV STRESS PROTOCOL 1: NORMAL
BH CV STRESS RECOVERY BP: NORMAL MMHG
BH CV STRESS RECOVERY HR: 88 BPM
BH CV STRESS SPEED STAGE 1: 1.7
BH CV STRESS SPEED STAGE 2: 2.5
BH CV STRESS SPEED STAGE 3: 3.4
BH CV STRESS SPEED STAGE 4: 4.2
BH CV STRESS STAGE 1: 1
BH CV STRESS STAGE 2: 2
BH CV STRESS STAGE 3: 3
BH CV STRESS STAGE 4: 4
MAXIMAL PREDICTED HEART RATE: 175 BPM
PERCENT MAX PREDICTED HR: 84.57 %
STRESS BASELINE BP: NORMAL MMHG
STRESS BASELINE HR: 71 BPM
STRESS PERCENT HR: 99 %
STRESS POST ESTIMATED WORKLOAD: 13.5 METS
STRESS POST EXERCISE DUR MIN: 11 MIN
STRESS POST EXERCISE DUR SEC: 0 SEC
STRESS POST PEAK BP: NORMAL MMHG
STRESS POST PEAK HR: 148 BPM
STRESS TARGET HR: 149 BPM

## 2024-08-21 PROCEDURE — 25510000001 PERFLUTREN 6.52 MG/ML SUSPENSION: Performed by: INTERNAL MEDICINE

## 2024-08-21 PROCEDURE — 93350 STRESS TTE ONLY: CPT

## 2024-08-21 PROCEDURE — 93017 CV STRESS TEST TRACING ONLY: CPT

## 2024-08-21 RX ADMIN — PERFLUTREN 8.48 MG: 6.52 INJECTION, SUSPENSION INTRAVENOUS at 09:34

## 2024-08-23 ENCOUNTER — TELEPHONE (OUTPATIENT)
Dept: FAMILY MEDICINE CLINIC | Facility: CLINIC | Age: 44
End: 2024-08-23
Payer: COMMERCIAL

## 2024-08-23 ENCOUNTER — LAB (OUTPATIENT)
Dept: LAB | Facility: HOSPITAL | Age: 44
End: 2024-08-23
Payer: COMMERCIAL

## 2024-08-23 ENCOUNTER — OFFICE VISIT (OUTPATIENT)
Dept: FAMILY MEDICINE CLINIC | Facility: CLINIC | Age: 44
End: 2024-08-23
Payer: COMMERCIAL

## 2024-08-23 VITALS
WEIGHT: 152 LBS | SYSTOLIC BLOOD PRESSURE: 130 MMHG | HEIGHT: 67 IN | TEMPERATURE: 98.2 F | RESPIRATION RATE: 20 BRPM | HEART RATE: 71 BPM | DIASTOLIC BLOOD PRESSURE: 87 MMHG | BODY MASS INDEX: 23.86 KG/M2

## 2024-08-23 DIAGNOSIS — Z12.5 PROSTATE CANCER SCREENING: ICD-10-CM

## 2024-08-23 DIAGNOSIS — Z72.0 TOBACCO USE: ICD-10-CM

## 2024-08-23 DIAGNOSIS — I10 PRIMARY HYPERTENSION: ICD-10-CM

## 2024-08-23 DIAGNOSIS — Z00.00 ENCOUNTER FOR ANNUAL PHYSICAL EXAM: Primary | ICD-10-CM

## 2024-08-23 DIAGNOSIS — Z00.00 ENCOUNTER FOR ANNUAL PHYSICAL EXAM: ICD-10-CM

## 2024-08-23 LAB
ALBUMIN SERPL-MCNC: 4.7 G/DL (ref 3.5–5)
ALBUMIN/GLOB SERPL: 1.4 G/DL (ref 1.1–2.5)
ALP SERPL-CCNC: 68 U/L (ref 24–120)
ALT SERPL W P-5'-P-CCNC: 39 U/L (ref 0–50)
ANION GAP SERPL CALCULATED.3IONS-SCNC: 7 MMOL/L (ref 4–13)
AST SERPL-CCNC: 29 U/L (ref 7–45)
AUTO MIXED CELLS #: 1 10*3/MM3 (ref 0.1–2.6)
AUTO MIXED CELLS %: 12.1 % (ref 0.1–24)
BILIRUB SERPL-MCNC: 0.5 MG/DL (ref 0.1–1)
BILIRUB UR QL STRIP: NEGATIVE
BUN SERPL-MCNC: 12 MG/DL (ref 5–21)
BUN/CREAT SERPL: 11.2
CALCIUM SPEC-SCNC: 9.7 MG/DL (ref 8.6–10.5)
CHLORIDE SERPL-SCNC: 104 MMOL/L (ref 98–110)
CHOLEST SERPL-MCNC: 156 MG/DL (ref 130–200)
CLARITY UR: CLEAR
CO2 SERPL-SCNC: 29 MMOL/L (ref 24–31)
COLOR UR: YELLOW
CREAT SERPL-MCNC: 1.07 MG/DL (ref 0.5–1.4)
EGFRCR SERPLBLD CKD-EPI 2021: 87.2 ML/MIN/1.73
ERYTHROCYTE [DISTWIDTH] IN BLOOD BY AUTOMATED COUNT: 13.6 % (ref 12.3–15.4)
GLOBULIN UR ELPH-MCNC: 3.4 GM/DL
GLUCOSE SERPL-MCNC: 74 MG/DL (ref 70–100)
GLUCOSE UR STRIP-MCNC: NEGATIVE MG/DL
HBA1C MFR BLD: 5.5 % (ref 4.8–5.9)
HCT VFR BLD AUTO: 54 % (ref 37.5–51)
HDLC SERPL-MCNC: 55 MG/DL
HGB BLD-MCNC: 18.4 G/DL (ref 13–17.7)
HGB UR QL STRIP.AUTO: NEGATIVE
KETONES UR QL STRIP: NEGATIVE
LDLC SERPL CALC-MCNC: 82 MG/DL (ref 0–99)
LDLC/HDLC SERPL: 1.45 {RATIO}
LEUKOCYTE ESTERASE UR QL STRIP.AUTO: NEGATIVE
LYMPHOCYTES # BLD AUTO: 2 10*3/MM3 (ref 0.7–3.1)
LYMPHOCYTES NFR BLD AUTO: 24.6 % (ref 19.6–45.3)
MCH RBC QN AUTO: 31 PG (ref 26.6–33)
MCHC RBC AUTO-ENTMCNC: 34.1 G/DL (ref 31.5–35.7)
MCV RBC AUTO: 91.1 FL (ref 79–97)
NEUTROPHILS NFR BLD AUTO: 5.2 10*3/MM3 (ref 1.7–7)
NEUTROPHILS NFR BLD AUTO: 63.3 % (ref 42.7–76)
NITRITE UR QL STRIP: NEGATIVE
PH UR STRIP.AUTO: 6 [PH] (ref 5–8)
PLATELET # BLD AUTO: 242 10*3/MM3 (ref 140–450)
PMV BLD AUTO: 9.6 FL (ref 6–12)
POTASSIUM SERPL-SCNC: 4.9 MMOL/L (ref 3.5–5.3)
PROT SERPL-MCNC: 8.1 G/DL (ref 6.3–8.7)
PROT UR QL STRIP: NEGATIVE
RBC # BLD AUTO: 5.93 10*6/MM3 (ref 4.14–5.8)
SODIUM SERPL-SCNC: 140 MMOL/L (ref 135–145)
SP GR UR STRIP: 1.02 (ref 1–1.03)
TRIGL SERPL-MCNC: 107 MG/DL (ref 0–149)
TSH SERPL DL<=0.05 MIU/L-ACNC: 1.18 UIU/ML (ref 0.27–4.2)
UROBILINOGEN UR QL STRIP: NORMAL
VLDLC SERPL-MCNC: 19 MG/DL (ref 5–40)
WBC NRBC COR # BLD AUTO: 8.2 10*3/MM3 (ref 3.4–10.8)

## 2024-08-23 PROCEDURE — 80061 LIPID PANEL: CPT

## 2024-08-23 PROCEDURE — 36415 COLL VENOUS BLD VENIPUNCTURE: CPT

## 2024-08-23 PROCEDURE — 81003 URINALYSIS AUTO W/O SCOPE: CPT

## 2024-08-23 PROCEDURE — 83036 HEMOGLOBIN GLYCOSYLATED A1C: CPT

## 2024-08-23 PROCEDURE — G0103 PSA SCREENING: HCPCS

## 2024-08-23 PROCEDURE — 80050 GENERAL HEALTH PANEL: CPT

## 2024-08-23 RX ORDER — NICOTINE 21 MG/24HR
PATCH, TRANSDERMAL 24 HOURS TRANSDERMAL
Qty: 14 EACH | Refills: 0 | Status: SHIPPED | OUTPATIENT
Start: 2024-08-23

## 2024-08-23 RX ORDER — LOSARTAN POTASSIUM 25 MG/1
25 TABLET ORAL DAILY
Qty: 30 TABLET | Refills: 5 | Status: SHIPPED | OUTPATIENT
Start: 2024-08-23

## 2024-08-23 RX ORDER — NICOTINE 21 MG/24HR
PATCH, TRANSDERMAL 24 HOURS TRANSDERMAL
Qty: 42 EACH | Refills: 0 | Status: SHIPPED | OUTPATIENT
Start: 2024-08-23

## 2024-08-23 NOTE — PROGRESS NOTES
"Chief Complaint  Hypertension and Annual Exam    Subjective    History of Present Illness      Patient presents to [facility] for   History of Present Illness  Pt is here today for a 2 week HTN f/u.  Pt reports his BP is doing better and he hasn't had any chest pain.  Pt brought BP log with him.       Review of Systems   All other systems reviewed and are negative.      I have reviewed and agree with the HPI and ROS information as above.  Juany Paige, APRN     Objective   Vital Signs:   /87   Pulse 71   Temp 98.2 °F (36.8 °C)   Resp 20   Ht 170.2 cm (67.01\")   Wt 68.9 kg (152 lb)   BMI 23.80 kg/m²     BMI is within normal parameters. No other follow-up for BMI required.      Physical Exam  Constitutional:       Appearance: Normal appearance. He is well-developed.   HENT:      Head: Normocephalic and atraumatic.      Right Ear: Tympanic membrane, ear canal and external ear normal.      Left Ear: Tympanic membrane, ear canal and external ear normal.      Nose: Nose normal. No septal deviation, nasal tenderness or congestion.      Mouth/Throat:      Lips: Pink. No lesions.      Mouth: Mucous membranes are moist. No oral lesions.      Dentition: Normal dentition.      Pharynx: Oropharynx is clear. No pharyngeal swelling, oropharyngeal exudate or posterior oropharyngeal erythema.   Eyes:      General: Lids are normal. Vision grossly intact. No scleral icterus.        Right eye: No discharge.         Left eye: No discharge.      Extraocular Movements: Extraocular movements intact.      Conjunctiva/sclera: Conjunctivae normal.      Right eye: Right conjunctiva is not injected.      Left eye: Left conjunctiva is not injected.      Pupils: Pupils are equal, round, and reactive to light.   Neck:      Thyroid: No thyroid mass.      Trachea: Trachea normal.   Cardiovascular:      Rate and Rhythm: Normal rate and regular rhythm.      Heart sounds: Normal heart sounds. No murmur " heard.     No gallop.   Pulmonary:      Effort: Pulmonary effort is normal.      Breath sounds: Normal breath sounds and air entry. No wheezing, rhonchi or rales.   Abdominal:      General: There is no distension.      Palpations: Abdomen is soft. There is no mass.      Tenderness: There is no abdominal tenderness. There is no right CVA tenderness, left CVA tenderness, guarding or rebound.   Musculoskeletal:         General: No tenderness or deformity. Normal range of motion.      Cervical back: Full passive range of motion without pain, normal range of motion and neck supple.      Thoracic back: Normal.      Right lower leg: No edema.      Left lower leg: No edema.   Skin:     General: Skin is warm and dry.      Coloration: Skin is not jaundiced.      Findings: No rash.   Neurological:      Mental Status: He is alert and oriented to person, place, and time.      Sensory: Sensation is intact.      Motor: Motor function is intact.      Coordination: Coordination is intact.      Gait: Gait is intact.      Deep Tendon Reflexes: Reflexes are normal and symmetric.   Psychiatric:         Mood and Affect: Mood and affect normal.         Judgment: Judgment normal.          MISA-7:      PHQ-2 Depression Screening  Little interest or pleasure in doing things?     Feeling down, depressed, or hopeless?     PHQ-2 Total Score       PHQ-9 Depression Screening  Little interest or pleasure in doing things?     Feeling down, depressed, or hopeless?     Trouble falling or staying asleep, or sleeping too much?     Feeling tired or having little energy?     Poor appetite or overeating?     Feeling bad about yourself - or that you are a failure or have let yourself or your family down?     Trouble concentrating on things, such as reading the newspaper or watching television?     Moving or speaking so slowly that other people could have noticed? Or the opposite - being so fidgety or restless that you have been moving around a lot more than  usual?     Thoughts that you would be better off dead, or of hurting yourself in some way?     PHQ-9 Total Score     If you checked off any problems, how difficult have these problems made it for you to do your work, take care of things at home, or get along with other people?        Result Review  Data Reviewed:                   Assessment and Plan      Diagnoses and all orders for this visit:    1. Encounter for annual physical exam (Primary)  -     CBC Auto Differential; Future  -     Comprehensive Metabolic Panel; Future  -     Hemoglobin A1c; Future  -     Lipid Panel; Future  -     TSH; Future  -     Urinalysis With Culture If Indicated -; Future    2. Primary hypertension  -     losartan (Cozaar) 25 MG tablet; Take 1 tablet by mouth Daily.  Dispense: 30 tablet; Refill: 5    3. Tobacco use  -     nicotine (Nicoderm CQ) 21 MG/24HR patch; Please apply 1  patch qd x6 weeks  Dispense: 42 each; Refill: 0  -     nicotine (Nicoderm CQ) 14 MG/24HR patch; Please apply 1 patch qd x2 weeks  Dispense: 14 each; Refill: 0  -     nicotine (Nicoderm CQ) 7 MG/24HR patch; Please apply 1 patch qd x2 weeks  Dispense: 14 each; Refill: 0    4. Prostate cancer screening  -     PSA SCREENING; Future    Patient is seen today for annual physical exam and chronic med refill.  Patient states that he feels much better since his blood pressure has decreased and he is also sleeping better.  Patient did bring a BP log with him today and his blood pressures running 130s over 80s.  Some days it is also running 120s over 70s.  Patient denies any chest pain, headache, dizziness.  I did receive his echo stress test in which this was low risk for ischemia.  Patient also continues to smoke and has been smoking for 30 years.  Patient would like to quit.  I discussed with him that we can trial the nicotine patches but he will need to decrease his tobacco intake as well.    Plan  Routine labs ordered  HTN stable with Losartan 25mg and cont at home bp  monitoring and low NA diet   Start Nicotine patches; and decrease nicotine intake.   Routine physical labs ordered     Walt Dee  reports that he has been smoking cigarettes. He started smoking about 34 years ago. He has a 17.3 pack-year smoking history. He has been exposed to tobacco smoke. He has never used smokeless tobacco. I have educated him on the risk of diseases from using tobacco products such as cancer, COPD, and heart disease.     I advised him to quit and he is willing to quit. We have discussed the following method/s for tobacco cessation:  Education Material, Counseling, and Prescription Medication.  Together we have set a quit date for 1 month from today.  He will follow up with me in 1 month or sooner to check on his progress.    I spent 7 minutes counseling the patient.       Preventive Care 40-64 Years Old, Male  Preventive care refers to lifestyle choices and visits with your health care provider that can promote health and wellness. Preventive care visits are also called wellness exams.  What can I expect for my preventive care visit?  Counseling  During your preventive care visit, your health care provider may ask about your:  Medical history, including:  Past medical problems.  Family medical history.  Current health, including:  Emotional well-being.  Home life and relationship well-being.  Sexual activity.  Lifestyle, including:  Alcohol, nicotine or tobacco, and drug use.  Access to firearms.  Diet, exercise, and sleep habits.  Safety issues such as seatbelt and bike helmet use.  Sunscreen use.  Work and work environment.  Physical exam  Your health care provider will check your:  Height and weight. These may be used to calculate your BMI (body mass index). BMI is a measurement that tells if you are at a healthy weight.  Waist circumference. This measures the distance around your waistline. This measurement also tells if you are at a healthy weight and may help predict your risk of  certain diseases, such as type 2 diabetes and high blood pressure.  Heart rate and blood pressure.  Body temperature.  Skin for abnormal spots.  What immunizations do I need?    Vaccines are usually given at various ages, according to a schedule. Your health care provider will recommend vaccines for you based on your age, medical history, and lifestyle or other factors, such as travel or where you work.  What tests do I need?  Screening  Your health care provider may recommend screening tests for certain conditions. This may include:  Lipid and cholesterol levels.  Diabetes screening. This is done by checking your blood sugar (glucose) after you have not eaten for a while (fasting).  Hepatitis B test.  Hepatitis C test.  HIV (human immunodeficiency virus) test.  STI (sexually transmitted infection) testing, if you are at risk.  Lung cancer screening.  Prostate cancer screening.  Colorectal cancer screening.  Talk with your health care provider about your test results, treatment options, and if necessary, the need for more tests.  Follow these instructions at home:  Eating and drinking    Eat a diet that includes fresh fruits and vegetables, whole grains, lean protein, and low-fat dairy products.  Take vitamin and mineral supplements as recommended by your health care provider.  Do not drink alcohol if your health care provider tells you not to drink.  If you drink alcohol:  Limit how much you have to 0-2 drinks a day.  Know how much alcohol is in your drink. In the [place] one drink equals one 12 oz bottle of beer (355 mL), one 5 oz glass of wine (148 mL), or one 1½ oz glass of hard liquor (44 mL).  Lifestyle  Brush your teeth every morning and night with fluoride toothpaste. Floss one time each day.  Exercise for at least 30 minutes 5 or more days each week.  Do not use any products that contain nicotine or tobacco. These products include cigarettes, chewing tobacco, and vaping devices, such as e-cigarettes. If you  need help quitting, ask your health care provider.  Do not use drugs.  If you are sexually active, practice safe sex. Use a condom or other form of protection to prevent STIs.  Take aspirin only as told by your health care provider. Make sure that you understand how much to take and what form to take. Work with your health care provider to find out whether it is safe and beneficial for you to take aspirin daily.  Find healthy ways to manage stress, such as:  Meditation, yoga, or listening to music.  Journaling.  Talking to a trusted person.  Spending time with friends and family.  Minimize exposure to UV radiation to reduce your risk of skin cancer.  Safety  Always wear your seat belt while driving or riding in a vehicle.  Do not drive:  If you have been drinking alcohol. Do not ride with someone who has been drinking.  When you are tired or distracted.  While texting.  If you have been using any mind-altering substances or drugs.  Wear a helmet and other protective equipment during sports activities.  If you have firearms in your house, make sure you follow all gun safety procedures.  What's next?  Go to your health care provider once a year for an annual wellness visit.  Ask your health care provider how often you should have your eyes and teeth checked.  Stay up to date on all vaccines.  This information is not intended to replace advice given to you by your health care provider. Make sure you discuss any questions you have with your health care provider.  Document Revised: 06/15/2022 Document Reviewed: 06/15/2022  [employer] Patient Education © 2024 [employer]             Follow Up   Return in about 6 months (around 2/23/2025) for Hypertension.  Patient was given instructions and counseling regarding his condition or for health maintenance advice. Please see specific information pulled into the AVS if appropriate.

## 2024-08-23 NOTE — TELEPHONE ENCOUNTER
----- Message from Juany Paige sent at 8/23/2024  7:36 AM CDT -----  Please let patient know that echo shows normal stress and low risk

## 2024-08-23 NOTE — TELEPHONE ENCOUNTER
Sent pt Pagido message relaying below    HUB TO RELAY  Your stress echo was normal; it showed a low risk for heart attack. Please let me know if you have any questions.

## 2024-08-24 LAB — PSA SERPL-MCNC: 0.67 NG/ML (ref 0–4)

## 2024-08-26 ENCOUNTER — TELEPHONE (OUTPATIENT)
Dept: FAMILY MEDICINE CLINIC | Facility: CLINIC | Age: 44
End: 2024-08-26
Payer: COMMERCIAL

## 2024-08-26 NOTE — TELEPHONE ENCOUNTER
----- Message from Juany Paige sent at 8/26/2024  9:54 AM CDT -----  Please let patient know that labs show  Cbc: increase in hgb, hct and rbc. This is likely related to him being a smoker and he would also benefit from donating blood   PSA stable   Tsh stable  Lipid panel great  Cmp stable  Ua negative   A1c great

## 2024-12-11 DIAGNOSIS — I10 PRIMARY HYPERTENSION: ICD-10-CM

## 2024-12-11 RX ORDER — LOSARTAN POTASSIUM 25 MG/1
25 TABLET ORAL DAILY
Qty: 90 TABLET | Refills: 0 | Status: SHIPPED | OUTPATIENT
Start: 2024-12-11

## 2024-12-11 NOTE — TELEPHONE ENCOUNTER
"Caller: Walt Dee \"MELANIE\"    Relationship: Self    Best call back number: [phone]     Requested Prescriptions:   Requested Prescriptions     Pending Prescriptions Disp Refills    losartan (Cozaar) 25 MG tablet 30 tablet 5     Sig: Take 1 tablet by mouth Daily.        Pharmacy where request should be sent: [facility] [address] [place] [address] [phone] [place] [phone]      Last office visit with prescribing clinician: 8/23/2024   Last telemedicine visit with prescribing clinician: Visit date not found   Next office visit with prescribing clinician: Visit date not found     Additional details provided by patient: PATIENT IS OUT AND [place] TOWN - PATIENT REQUESTS A 3 MONTH SUPPLY    Does the patient have less than a 3 day supply:  [x] Yes  [] No    Would you like a call back once the refill request has been completed: [x] Yes [] No    If the office needs to give you a call back, can they leave a voicemail: [x] Yes [] No    Familia Landaverde Rep   12/11/24 12:47 CST         "

## 2025-03-12 DIAGNOSIS — I10 PRIMARY HYPERTENSION: ICD-10-CM

## 2025-03-12 NOTE — TELEPHONE ENCOUNTER
"    Caller: Walt Dee \"MELANIE\"    Relationship: Self    Best call back number: [phone]       Requested Prescriptions:   Requested Prescriptions     Pending Prescriptions Disp Refills    losartan (Cozaar) 25 MG tablet 90 tablet 0     Sig: Take 1 tablet by mouth Daily.        Pharmacy where request should be sent: [facility] [address] [place] [address] [phone] [place] [phone]      Last office visit with prescribing clinician: 8/23/2024   Last telemedicine visit with prescribing clinician: Visit date not found   Next office visit with prescribing clinician: Visit date not found     Additional details provided by patient: PATIENT IS OUT    Does the patient have less than a 3 day supply:  [x] Yes  [] No    Would you like a call back once the refill request has been completed: [x] Yes [] No    If the office needs to give you a call back, can they leave a voicemail: [x] Yes [] No    Familia Landaverde   03/12/25 15:24 CDT            "

## 2025-03-17 RX ORDER — LOSARTAN POTASSIUM 25 MG/1
25 TABLET ORAL DAILY
Qty: 90 TABLET | Refills: 0 | Status: SHIPPED | OUTPATIENT
Start: 2025-03-17

## 2025-06-12 DIAGNOSIS — I10 PRIMARY HYPERTENSION: ICD-10-CM

## 2025-06-13 RX ORDER — LOSARTAN POTASSIUM 25 MG/1
25 TABLET ORAL DAILY
Qty: 30 TABLET | Refills: 0 | Status: SHIPPED | OUTPATIENT
Start: 2025-06-13